# Patient Record
Sex: FEMALE | Race: WHITE | NOT HISPANIC OR LATINO | URBAN - METROPOLITAN AREA
[De-identification: names, ages, dates, MRNs, and addresses within clinical notes are randomized per-mention and may not be internally consistent; named-entity substitution may affect disease eponyms.]

---

## 2017-02-27 ENCOUNTER — APPOINTMENT (OUTPATIENT)
Age: 57
Setting detail: DERMATOLOGY
End: 2017-02-27

## 2017-02-27 DIAGNOSIS — Z41.9 ENCOUNTER FOR PROCEDURE FOR PURPOSES OTHER THAN REMEDYING HEALTH STATE, UNSPECIFIED: ICD-10-CM

## 2017-02-27 PROCEDURE — OTHER COSMETIC CONSULTATION: COOLSCULPTING: OTHER

## 2017-02-27 PROCEDURE — OTHER OTHER (COSMETIC): OTHER

## 2017-02-27 NOTE — PROCEDURE: OTHER (COSMETIC)
Other (Free Text): pt came for coolsculpting consult, all information and questions answered. Pt heard about coolsculpting online. LM
Detail Level: Zone

## 2017-03-02 ENCOUNTER — APPOINTMENT (OUTPATIENT)
Age: 57
Setting detail: DERMATOLOGY
End: 2017-03-06

## 2017-03-02 DIAGNOSIS — Z41.9 ENCOUNTER FOR PROCEDURE FOR PURPOSES OTHER THAN REMEDYING HEALTH STATE, UNSPECIFIED: ICD-10-CM

## 2017-03-02 PROCEDURE — OTHER COOLSCULPTING: OTHER

## 2017-03-02 PROCEDURE — OTHER OTHER (COSMETIC): OTHER

## 2017-03-02 ASSESSMENT — LOCATION DETAILED DESCRIPTION DERM
LOCATION DETAILED: PERIUMBILICAL SKIN
LOCATION DETAILED: LEFT PROXIMAL POSTERIOR UPPER ARM
LOCATION DETAILED: RIGHT PROXIMAL POSTERIOR UPPER ARM

## 2017-03-02 ASSESSMENT — LOCATION ZONE DERM
LOCATION ZONE: ARM
LOCATION ZONE: TRUNK

## 2017-03-02 ASSESSMENT — LOCATION SIMPLE DESCRIPTION DERM
LOCATION SIMPLE: ABDOMEN
LOCATION SIMPLE: LEFT POSTERIOR UPPER ARM
LOCATION SIMPLE: RIGHT POSTERIOR UPPER ARM

## 2017-03-02 NOTE — PROCEDURE: OTHER (COSMETIC)
Detail Level: Zone
Other (Free Text): Pt tolerated all 4 cycles well. Pt understands pre and post instructions. Pt will return tomorrow to have another 4 cycles. Carlos

## 2017-03-02 NOTE — PROCEDURE: COOLSCULPTING
Applicators: CoolAdvantage Core
Location: right arm
Applicators: Cool[employer]vantage Fit
Suction Settings: The suction settings were per protocol.
Time (Minutes): 35
Consent: Written consent obtained, risks reviewed including but not limited to blistering from suction, darker or lighter pigmentary change, bruising, and/or need for multiple treatments.
Detail Level: Zone
Indication: non-invasive fat removal
Intro: Prior to treatment, the area was cleaned with alcohol and marked out with a marking pen. The gel sheet was then applied uniformly. The applicator was applied to the skin with good contact and suction.
Location: lower abdomen
Post Treatment: After treatment, the suction was turned off, and the applicator was removed from the skin.
Post-Care Instructions: I reviewed with the patient in detail post-care instructions. Patient should stay away from the sun and wear sun protection until treated areas are fully healed.
Price (Use Numbers Only, No Special Characters Or $): 2025.00
Device: SynapCell
Location: upper abdomen
Location: flank (left)

## 2017-03-03 ENCOUNTER — APPOINTMENT (OUTPATIENT)
Age: 57
Setting detail: DERMATOLOGY
End: 2017-03-09

## 2017-03-03 DIAGNOSIS — Z41.9 ENCOUNTER FOR PROCEDURE FOR PURPOSES OTHER THAN REMEDYING HEALTH STATE, UNSPECIFIED: ICD-10-CM

## 2017-03-03 PROCEDURE — OTHER OTHER (COSMETIC): OTHER

## 2017-03-03 PROCEDURE — OTHER COOLSCULPTING: OTHER

## 2017-03-03 ASSESSMENT — LOCATION ZONE DERM: LOCATION ZONE: LEG

## 2017-03-03 ASSESSMENT — LOCATION DETAILED DESCRIPTION DERM
LOCATION DETAILED: RIGHT PROXIMAL LATERAL POSTERIOR THIGH
LOCATION DETAILED: LEFT PROXIMAL LATERAL POSTERIOR THIGH
LOCATION DETAILED: RIGHT PROXIMAL POSTERIOR THIGH
LOCATION DETAILED: LEFT PROXIMAL POSTERIOR THIGH

## 2017-03-03 ASSESSMENT — LOCATION SIMPLE DESCRIPTION DERM
LOCATION SIMPLE: RIGHT POSTERIOR THIGH
LOCATION SIMPLE: LEFT POSTERIOR THIGH

## 2017-03-03 NOTE — PROCEDURE: OTHER (COSMETIC)
Detail Level: Zone
Other (Free Text): pt tolerated cycles well. She said she was slightly sore in the areas treated yesterday but feeling good overall. Will come in 12 weeks for follow up.JUAN CARLOS

## 2017-03-03 NOTE — PROCEDURE: COOLSCULPTING
Post-Care Instructions: I reviewed with the patient in detail post-care instructions. Patient should stay away from the sun and wear sun protection until treated areas are fully healed.
Applicators: CoolAdvantage Fit
Location: thigh (right)
Time (Minutes): 35
Location: lateral thigh (right)
Suction Settings: The suction settings were per protocol.\\nbanana roll
Post Treatment: After treatment, the suction was turned off, and the applicator was removed from the skin.
Applicators: Cool[employer]vantage Core
Suction Settings: The suction settings were per protocol.\\n
Detail Level: Zone
Location: thigh (left)
Intro: Prior to treatment, the area was cleaned with alcohol and marked out with a marking pen. The gel sheet was then applied uniformly. The applicator was applied to the skin with good contact and suction.
Suction Settings: The suction settings were per protocol.
Indication: non-invasive fat removal
Device: enGene
Location: lateral thigh (left)
Consent: Written consent obtained, risks reviewed including but not limited to blistering from suction, darker or lighter pigmentary change, bruising, and/or need for multiple treatments.
Price (Use Numbers Only, No Special Characters Or $): 2025.00